# Patient Record
Sex: MALE | Race: OTHER
[De-identification: names, ages, dates, MRNs, and addresses within clinical notes are randomized per-mention and may not be internally consistent; named-entity substitution may affect disease eponyms.]

---

## 2020-08-28 ENCOUNTER — HOSPITAL ENCOUNTER (OUTPATIENT)
Dept: HOSPITAL 72 - CAT | Age: 40
Discharge: HOME | End: 2020-08-28
Payer: COMMERCIAL

## 2020-08-28 DIAGNOSIS — N13.2: ICD-10-CM

## 2020-08-28 DIAGNOSIS — R10.9: Primary | ICD-10-CM

## 2020-08-28 DIAGNOSIS — R31.9: ICD-10-CM

## 2020-08-28 PROCEDURE — 74176 CT ABD & PELVIS W/O CONTRAST: CPT

## 2020-08-28 NOTE — DIAGNOSTIC IMAGING REPORT
Indication: 

 

Indication: Right flank pain, hematuria

 

Technique: Spiral acquisitions obtained through the abdomen and pelvis. No oral or IV

contrast utilized, per urinary stone protocol. Multiplanar reconstructions were

generated.  Total dose length product 285 mGycm. CTDIvol(s)  5 mGy. Dose reduction

achieved using automated exposure control

 

 

Comparison:  none

 

Findings: There is a 4 mm calculus projected at the right distal ureteral orifice.

This results in mild right hydronephrosis and hydroureter, minimal perinephric and

periureteral fat stranding and slight swelling of the kidney. No intrarenal calculi

are demonstrated. No left renal or ureteral calculi are evident. The bladder is

unremarkable. There is a small prostatic calcification. Lack of IV contrast limits

assessment of the renal parenchyma. No gross renal parenchymal mass or cyst.

 

Lack of IV contrast limits assessment of the other solid organs. The liver,

gallbladder, bile ducts, pancreas, spleen, adrenals are unremarkable. No

retroperitoneal or mesenteric mass or adenopathy. No pelvic mass or adenopathy.

 

No evidence of diverticulosis or diverticulitis. The appendix is not visualized, by

history has been removed. No small bowel distention. No free or loculated

intraperitoneal gas or fluid is evident. The distal esophagus, stomach, duodenum are

unremarkable.

 

The included lung bases are clear. The bones are unremarkable.

 

Impression: 4 mm right distal ureteral calculus, projected at the ureteral orifice.

Mild right hydronephrosis, hydroureter, renal swelling and perinephric fat stranding

 

No intrarenal calculi demonstrated

 

No other abnormality

 

The CT scanner at [facility] is accredited by the [employer]

[employer] and the scans are performed using protocols designed to limit radiation

exposure to as low as reasonably achievable to attain images of sufficient resolution

adequate for diagnostic evaluation.

## 2020-10-23 ENCOUNTER — HOSPITAL ENCOUNTER (OUTPATIENT)
Dept: HOSPITAL 72 - RAD | Age: 40
Discharge: HOME | End: 2020-10-23
Payer: COMMERCIAL

## 2020-10-23 DIAGNOSIS — J32.9: ICD-10-CM

## 2020-10-23 DIAGNOSIS — R51.9: Primary | ICD-10-CM

## 2020-10-23 PROCEDURE — 70551 MRI BRAIN STEM W/O DYE: CPT

## 2020-10-23 NOTE — DIAGNOSTIC IMAGING REPORT
EXAM: MRI  MRI Brain no Contrast

 

COMPARISON: None

 

HISTORY: Severe headache x2 weeks.

 

TECHNIQUE: MR scan of the brain includes sagittal T1, axial T1, T2, FLAIR,

diffusion-weighted and gradient sequences. 

 

FINDINGS:

 

There is no acute infarct, hemorrhage, mass effect or shift. Ventricles and cisterns

appear unremarkable. Brainstem and posterior fossa appear unremarkable. The sella and

parasellar regions are normal. Normal flow voids identified in the carotid siphons. 

Bilateral ethmoid mucosal thickening noted. There are polypoid mucosal thickening

versus retention cyst noted in the maxillary sinuses.

 

IMPRESSION: 

 

NO ACUTE INTRACRANIAL ABNORMALITY.

 

SINUS DISEASE.